# Patient Record
Sex: FEMALE | Race: WHITE | Employment: FULL TIME | URBAN - METROPOLITAN AREA
[De-identification: names, ages, dates, MRNs, and addresses within clinical notes are randomized per-mention and may not be internally consistent; named-entity substitution may affect disease eponyms.]

---

## 2017-11-16 ENCOUNTER — OFFICE VISIT (OUTPATIENT)
Dept: FAMILY MEDICINE CLINIC | Facility: CLINIC | Age: 47
End: 2017-11-16

## 2017-11-16 VITALS
DIASTOLIC BLOOD PRESSURE: 60 MMHG | WEIGHT: 139 LBS | SYSTOLIC BLOOD PRESSURE: 100 MMHG | OXYGEN SATURATION: 99 % | HEIGHT: 67.5 IN | HEART RATE: 64 BPM | TEMPERATURE: 98 F | RESPIRATION RATE: 17 BRPM | BODY MASS INDEX: 21.56 KG/M2

## 2017-11-16 DIAGNOSIS — R14.0 BLOATING: ICD-10-CM

## 2017-11-16 DIAGNOSIS — Z00.00 LABORATORY EXAM ORDERED AS PART OF ROUTINE GENERAL MEDICAL EXAMINATION: ICD-10-CM

## 2017-11-16 DIAGNOSIS — T78.1XXA FOOD SENSITIVITY WITH GASTROINTESTINAL SYMPTOMS: Primary | ICD-10-CM

## 2017-11-16 DIAGNOSIS — I48.0 PAROXYSMAL ATRIAL FIBRILLATION (HCC): ICD-10-CM

## 2017-11-16 DIAGNOSIS — Z12.31 ENCOUNTER FOR SCREENING MAMMOGRAM FOR BREAST CANCER: ICD-10-CM

## 2017-11-16 DIAGNOSIS — R53.82 CHRONIC FATIGUE: ICD-10-CM

## 2017-11-16 PROCEDURE — 86628 CANDIDA ANTIBODY: CPT | Performed by: FAMILY MEDICINE

## 2017-11-16 PROCEDURE — 86001 ALLERGEN SPECIFIC IGG: CPT | Performed by: FAMILY MEDICINE

## 2017-11-16 PROCEDURE — 84443 ASSAY THYROID STIM HORMONE: CPT | Performed by: FAMILY MEDICINE

## 2017-11-16 PROCEDURE — 85025 COMPLETE CBC W/AUTO DIFF WBC: CPT | Performed by: FAMILY MEDICINE

## 2017-11-16 PROCEDURE — 99204 OFFICE O/P NEW MOD 45 MIN: CPT | Performed by: FAMILY MEDICINE

## 2017-11-16 PROCEDURE — 82627 DEHYDROEPIANDROSTERONE: CPT | Performed by: FAMILY MEDICINE

## 2017-11-16 RX ORDER — MULTIVIT-MIN/IRON/FOLIC ACID/K 18-600-40
5000 CAPSULE ORAL
COMMUNITY
End: 2018-11-13

## 2017-11-16 NOTE — PROGRESS NOTES
Nabila Stack is a 51year old female. Patient presents with:  Establish Care: Interested in [place], due for mammogram      HPI:   Interested in an Integrative approach to her health. Has worked as an ER doctor for 18 years.  Recently cho Probiotic Product (PROBIOTIC-10) Oral Cap Take by mouth.  Disp:  Rfl:        SOCIAL HISTORY:     Social History  Social History   Marital status:   Spouse name: N/A    Years of education: N/A  Number of children: N/A     Occupational History  None on - ALLERGEN, FOOD COMMON PANEL IGG  - EDUARDA IGG/A/M AB PANEL    2. Laboratory exam ordered as part of routine general medical examination  - ALLERGEN, FOOD COMMON PANEL IGG; Future  - EDUARDA IGG/A/M AB PANEL;  Future  - DEHYDROEPIANDROSTERONE SULFATE  - T CBC W Differential W Platelet [E]    Patient Instructions   In order to determine if Medical Nutrition Therapy and/or Acupuncture are covered benefits, I encourage you call your insurance company and ask:  ·        If (acupuncture/medical nutrition) is An exercise program that I love is called Classical Stretch. It can work for a variety of fitness levels. It tones and stretches in alignment with the way your body functions. And it works the whole body.  You can find it on [facility] in the mornings and afterno · Focus on positive things in your life. You are a powerful creator in your life and when you focus on the positive you will be surprised at how that changes your world.      How to Get Started with a Meditation or Deep Breathing Practice    Start with 5 mi [email or web address]    [email or web address]    Marge.[email or web address]

## 2017-11-17 PROBLEM — I48.0 PAROXYSMAL ATRIAL FIBRILLATION (HCC): Status: ACTIVE | Noted: 2017-11-17

## 2017-12-11 ENCOUNTER — OFFICE VISIT (OUTPATIENT)
Dept: FAMILY MEDICINE CLINIC | Facility: CLINIC | Age: 47
End: 2017-12-11

## 2017-12-11 ENCOUNTER — MED REC SCAN ONLY (OUTPATIENT)
Dept: FAMILY MEDICINE CLINIC | Facility: CLINIC | Age: 47
End: 2017-12-11

## 2017-12-11 VITALS
SYSTOLIC BLOOD PRESSURE: 108 MMHG | TEMPERATURE: 98 F | BODY MASS INDEX: 22.18 KG/M2 | HEIGHT: 67.5 IN | OXYGEN SATURATION: 98 % | WEIGHT: 143 LBS | HEART RATE: 62 BPM | DIASTOLIC BLOOD PRESSURE: 62 MMHG

## 2017-12-11 DIAGNOSIS — T78.1XXA FOOD SENSITIVITY WITH GASTROINTESTINAL SYMPTOMS: ICD-10-CM

## 2017-12-11 DIAGNOSIS — R53.82 CHRONIC FATIGUE: ICD-10-CM

## 2017-12-11 DIAGNOSIS — E60 LOW ZINC LEVEL: Primary | ICD-10-CM

## 2017-12-11 PROCEDURE — 99214 OFFICE O/P EST MOD 30 MIN: CPT | Performed by: FAMILY MEDICINE

## 2017-12-11 NOTE — PATIENT INSTRUCTIONS
The products and items listed below (the “Products”)  and their claims have not been evaluated by the Food and Drug Administration. Dietary products are not intended to treat, prevent, mitigate or cure disease.  Ultimately, you must draw your own conclusion Practitioner's Last Name: Seema Do    Then register with your name, phone and address. Purchase the recommended products and they will be sent directly to your address. Activ[employer]rients    Description:  This high-quality, hypoallergenic, multivitamin/minera ? Grass-fed Beef  ? Spirulina  ? Chinese Cabbage  ? Cottage Cheese  ? Asparagus  ? Broccoli Kelly  ? [employer] (Cappuccini, [place] and Asprogia)  ? Venison  ? [place] ([facility])        RECIPE IDEAS  Broccoli Salad  Ingredients:   For dressing:  ? ¾ cup raw cashews, soaked Add all ingredients together in a large bowl, and toss to combine. Serve immediately.   (Or if you are not going to serve the entire salad in one setting, the salad can be mixed without the dressing and refrigerated in a sealed container for up to 2 days w ? 2 cloves garlic, chopped  ? 6 cups low-sodium chicken broth  ? 6 cups stemmed and torn mustard greens (about 1 bunch) or kale  ? 1 pound sweet potatoes (about 2 medium), peeled and cut into 1-inch pieces  ?  1 cup dried lentils  ? kosher salt and black pe

## 2017-12-19 NOTE — PROGRESS NOTES
Jose Lindsay is a 49year old female. Patient presents with:  Lab Results      HPI:   Feeling good. Took a month off from work, so getting to relax and take more time for herself.   Keeping busy, still exercising, but doing more low-impact, low-i • Implant left  2010   • Implant right  2010   • Needle biopsy left  2007   • Thoracotomy,ltd,biopsy  1984    left thoracotomy   • Upper gi endoscopy,biopsy  7/2013       PHYSICAL EXAM:      12/11/17  1319   BP: 108/62   Pulse: 62   Temp: 97.8 °F (36.6 °C) The products and items listed below (the “Products”)  and their claims have not been evaluated by the Food and Drug Administration. Dietary products are not intended to treat, prevent, mitigate or cure disease.  Ultimately, you must draw your own conclusion Practitioner's Last Name: Yuri Vital    Then register with your name, phone and address. Purchase the recommended products and they will be sent directly to your address. [employer]    Description:  This high-quality, hypoallergenic, multivitamin/minera ? Grass-fed Beef  ? Spirulina  ? Chinese Cabbage  ? Cottage Cheese  ? Asparagus  ? Broccoli Kelly  ? [employer] (Cappuccini, [place] and Asprogia)  ? Venison  ? [place] ([facility])        RECIPE IDEAS  Broccoli Salad  Ingredients:   For dressing:  ? ¾ cup raw cashews, soaked Add all ingredients together in a large bowl, and toss to combine. Serve immediately.   (Or if you are not going to serve the entire salad in one setting, the salad can be mixed without the dressing and refrigerated in a sealed container for up to 2 days w ? 2 cloves garlic, chopped  ? 6 cups low-sodium chicken broth  ? 6 cups stemmed and torn mustard greens (about 1 bunch) or kale  ? 1 pound sweet potatoes (about 2 medium), peeled and cut into 1-inch pieces  ?  1 cup dried lentils  ? kosher salt and black pe

## 2018-01-23 ENCOUNTER — OFFICE VISIT (OUTPATIENT)
Dept: OBGYN CLINIC | Facility: CLINIC | Age: 48
End: 2018-01-23

## 2018-01-23 VITALS
DIASTOLIC BLOOD PRESSURE: 62 MMHG | SYSTOLIC BLOOD PRESSURE: 108 MMHG | BODY MASS INDEX: 22 KG/M2 | HEART RATE: 69 BPM | WEIGHT: 141.63 LBS

## 2018-01-23 DIAGNOSIS — T83.32XA INTRAUTERINE CONTRACEPTIVE DEVICE THREADS LOST, INITIAL ENCOUNTER: ICD-10-CM

## 2018-01-23 DIAGNOSIS — Z01.419 ENCOUNTER FOR GYNECOLOGICAL EXAMINATION: Primary | ICD-10-CM

## 2018-01-23 PROCEDURE — 99386 PREV VISIT NEW AGE 40-64: CPT | Performed by: OBSTETRICS & GYNECOLOGY

## 2018-01-24 LAB — HPV I/H RISK 1 DNA SPEC QL NAA+PROBE: NEGATIVE

## 2018-01-24 NOTE — PROGRESS NOTES
Luis Manuel Arcos is a 49year old female [id number] No LMP recorded. Patient has had an ablation.  here for annual exam.       Last seen in past.   Had endometrial ablation in 2010 in [place] and had Mirena IUD placed immediately after for birth [facility] or double vision  Cardiovascular:  denies chest pain or palpitations  Respiratory:  denies shortness of breath  Gastrointestinal:  denies heartburn, abdominal pain, diarrhea or constipation  Genitourinary:  denies dysuria, incontinence, abnormal vaginal di Annual exams encouraged. RTC 1 year or prn    2. Intrauterine contraceptive device threads lost, initial encounter  Order for pelvic u/s. Pt will make another appt for IUD removal and replace Mirena IUD.       No prescriptions requested or ordered in thi

## 2018-01-30 ENCOUNTER — HOSPITAL ENCOUNTER (OUTPATIENT)
Dept: ULTRASOUND IMAGING | Age: 48
Discharge: HOME | End: 2018-01-30
Attending: OBSTETRICS & GYNECOLOGY
Payer: COMMERCIAL

## 2018-01-30 DIAGNOSIS — T83.32XA INTRAUTERINE CONTRACEPTIVE DEVICE THREADS LOST, INITIAL ENCOUNTER: ICD-10-CM

## 2018-01-30 PROCEDURE — 76830 TRANSVAGINAL US NON-OB: CPT | Performed by: OBSTETRICS & GYNECOLOGY

## 2018-01-30 PROCEDURE — 76856 US EXAM PELVIC COMPLETE: CPT | Performed by: OBSTETRICS & GYNECOLOGY

## 2018-02-08 ENCOUNTER — OFFICE VISIT (OUTPATIENT)
Dept: ORTHOPEDICS CLINIC | Facility: CLINIC | Age: 48
End: 2018-02-08

## 2018-02-08 VITALS — DIASTOLIC BLOOD PRESSURE: 58 MMHG | HEART RATE: 60 BPM | SYSTOLIC BLOOD PRESSURE: 100 MMHG | RESPIRATION RATE: 16 BRPM

## 2018-02-08 DIAGNOSIS — M25.511 PAIN IN JOINT OF RIGHT SHOULDER: Primary | ICD-10-CM

## 2018-02-08 PROCEDURE — 20610 DRAIN/INJ JOINT/BURSA W/O US: CPT | Performed by: ORTHOPAEDIC SURGERY

## 2018-02-08 PROCEDURE — 99243 OFF/OP CNSLTJ NEW/EST LOW 30: CPT | Performed by: ORTHOPAEDIC SURGERY

## 2018-02-08 PROCEDURE — 99212 OFFICE O/P EST SF 10 MIN: CPT | Performed by: ORTHOPAEDIC SURGERY

## 2018-02-08 NOTE — PROGRESS NOTES
Per verbal order from VT, draw up 2ml of Kenalog 10 and 2ml of 1% lidocaine for cortisone injection to right shoulder. Sandrita May    Patient left before post injection vitals were taken.  Sandrita May

## 2018-02-08 NOTE — H&P
Chief Complaint: Right shoulder pain    NURSING INTAKE COMMENTS: Patient presents with:  Shoulder Pain: Right - onset years ago - no injury - was dx with bursitis- had 3 times injections - pain restarted 2 years ago on and off and for the past 2 months [facility] WNL WNL   Atrophy None None   Bruising None None   Strength        Supraspinatus 5/5 5/5      Infraspinatus 5/5 5/5      Teres Minor 5/5 5/5      Subscapularis 5/5 5/5      Deltoid 5/5 5/5      Biceps 5/5 5/5        Pain         Rotator Cuff resistanc

## 2018-02-13 ENCOUNTER — TELEPHONE (OUTPATIENT)
Dept: OBGYN CLINIC | Facility: CLINIC | Age: 48
End: 2018-02-13

## 2018-02-14 ENCOUNTER — TELEPHONE (OUTPATIENT)
Dept: OBGYN CLINIC | Facility: CLINIC | Age: 48
End: 2018-02-14

## 2018-02-14 NOTE — TELEPHONE ENCOUNTER
LMTCB. Want to make sure pt aware of appt and recs for IUD removal, hcg the day prior and motrin prior and cytotec the night before.

## 2018-02-14 NOTE — TELEPHONE ENCOUNTER
Pt has an appt scheduled at this point on 2/28/18 for IUD removal only. Pt wants EMELYK's opinion.  Pt had sent mychart stating   There wasn't much of a stripe from what I could tell, so I am assuming with my age and ablation combined with the U/S finding I a

## 2018-02-15 NOTE — TELEPHONE ENCOUNTER
The endometrial lining is thin due to the endometrial ablation and Mirena IUD. She is at low risk for pregnancy due to age, not due to thin endometrial lining.   She can make final decision about reinsertion at time of her IUD removal

## 2018-02-28 ENCOUNTER — OFFICE VISIT (OUTPATIENT)
Dept: OBGYN CLINIC | Facility: CLINIC | Age: 48
End: 2018-02-28

## 2018-02-28 VITALS
BODY MASS INDEX: 22 KG/M2 | WEIGHT: 143.38 LBS | DIASTOLIC BLOOD PRESSURE: 64 MMHG | SYSTOLIC BLOOD PRESSURE: 103 MMHG | HEART RATE: 65 BPM

## 2018-02-28 DIAGNOSIS — Z30.432 ENCOUNTER FOR REMOVAL OF INTRAUTERINE CONTRACEPTIVE DEVICE: Primary | ICD-10-CM

## 2018-02-28 PROCEDURE — 58301 REMOVE INTRAUTERINE DEVICE: CPT | Performed by: OBSTETRICS & GYNECOLOGY

## 2018-02-28 NOTE — PROCEDURES
IUD Removal     Consent signed. Procedure discussed with the patient in detail including indication, risks, benefits, alternatives and complications.     Pelvic Exam Findings:  Lesion description:  IUD strings NOT seen from cervix    Procedure:  Speculum

## 2018-04-03 ENCOUNTER — PATIENT MESSAGE (OUTPATIENT)
Dept: ORTHOPEDICS CLINIC | Facility: CLINIC | Age: 48
End: 2018-04-03

## 2018-04-03 DIAGNOSIS — M75.121 COMPLETE TEAR OF RIGHT ROTATOR CUFF: Primary | ICD-10-CM

## 2018-04-03 NOTE — TELEPHONE ENCOUNTER
From: Mega Cadet  To: Aba Mar MD  Sent: 4/3/2018 10:01 AM CDT  Subject: Visit Follow-up Question    Antonino Oetro.     I pretty much haven’t been doing anything, and my shoulder still bothers me quite a bit if I reach behind me, like to scr

## 2018-04-04 ENCOUNTER — TELEPHONE (OUTPATIENT)
Dept: ORTHOPEDICS CLINIC | Facility: CLINIC | Age: 48
End: 2018-04-04

## 2018-04-04 NOTE — TELEPHONE ENCOUNTER
Called VANESSA and s/w Valdemar to initiate PA for MRI right shoulder and gave him clinicals per VT OV notes. MRI approved [facility]#[id number] exp 5/3/18 at [address] [place] [address].    Called pt DIGNA

## 2018-04-10 ENCOUNTER — TELEPHONE (OUTPATIENT)
Dept: ORTHOPEDICS CLINIC | Facility: CLINIC | Age: 48
End: 2018-04-10

## 2018-04-10 ENCOUNTER — HOSPITAL ENCOUNTER (OUTPATIENT)
Dept: MRI IMAGING | Facility: HOSPITAL | Age: 48
Discharge: HOME | End: 2018-04-10
Attending: ORTHOPAEDIC SURGERY
Payer: COMMERCIAL

## 2018-04-10 DIAGNOSIS — M75.121 COMPLETE TEAR OF RIGHT ROTATOR CUFF: ICD-10-CM

## 2018-04-10 PROCEDURE — 73221 MRI JOINT UPR EXTREM W/O DYE: CPT | Performed by: ORTHOPAEDIC SURGERY

## 2018-04-16 ENCOUNTER — TELEPHONE (OUTPATIENT)
Dept: ORTHOPEDICS CLINIC | Facility: CLINIC | Age: 48
End: 2018-04-16

## 2018-04-16 DIAGNOSIS — M75.101 ROTATOR CUFF SYNDROME OF RIGHT SHOULDER: Primary | ICD-10-CM

## 2018-04-16 NOTE — TELEPHONE ENCOUNTER
Patient is looking to see if an order can be put in for physical therapy for right shoulder pain. Please call. Thank you.

## 2018-04-19 NOTE — TELEPHONE ENCOUNTER
Call to [facility]. Notified of 8 physical therapy visits. Pt wants to have therapy done at [facility]. Informed pt to get a fax number and call us back with it and we will fax therapy order to facility.    Verbaloizes understanding and will call back with a fax

## 2018-05-04 ENCOUNTER — HOSPITAL ENCOUNTER (OUTPATIENT)
Dept: GENERAL RADIOLOGY | Facility: HOSPITAL | Age: 48
Discharge: HOME | End: 2018-05-04
Attending: ORTHOPAEDIC SURGERY
Payer: COMMERCIAL

## 2018-05-04 DIAGNOSIS — G89.29 CHRONIC RIGHT SHOULDER PAIN: ICD-10-CM

## 2018-05-04 DIAGNOSIS — M25.511 CHRONIC RIGHT SHOULDER PAIN: ICD-10-CM

## 2018-05-04 PROCEDURE — 20610 DRAIN/INJ JOINT/BURSA W/O US: CPT | Performed by: ORTHOPAEDIC SURGERY

## 2018-05-04 PROCEDURE — 77002 NEEDLE LOCALIZATION BY XRAY: CPT | Performed by: ORTHOPAEDIC SURGERY

## 2018-05-04 RX ORDER — LIDOCAINE HYDROCHLORIDE 10 MG/ML
5 INJECTION, SOLUTION EPIDURAL; INFILTRATION; INTRACAUDAL; PERINEURAL ONCE
Status: COMPLETED | OUTPATIENT
Start: 2018-05-04 | End: 2018-05-04

## 2018-05-04 RX ORDER — LIDOCAINE HYDROCHLORIDE 10 MG/ML
INJECTION, SOLUTION EPIDURAL; INFILTRATION; INTRACAUDAL; PERINEURAL
Status: COMPLETED
Start: 2018-05-04 | End: 2018-05-04

## 2018-05-04 RX ORDER — METHYLPREDNISOLONE ACETATE 40 MG/ML
40 INJECTION, SUSPENSION INTRA-ARTICULAR; INTRALESIONAL; INTRAMUSCULAR; SOFT TISSUE ONCE
Status: COMPLETED | OUTPATIENT
Start: 2018-05-04 | End: 2018-05-04

## 2018-05-04 RX ORDER — METHYLPREDNISOLONE ACETATE 40 MG/ML
INJECTION, SUSPENSION INTRA-ARTICULAR; INTRALESIONAL; INTRAMUSCULAR; SOFT TISSUE
Status: COMPLETED
Start: 2018-05-04 | End: 2018-05-04

## 2018-05-04 RX ADMIN — METHYLPREDNISOLONE ACETATE 40 MG: 40 INJECTION, SUSPENSION INTRA-ARTICULAR; INTRALESIONAL; INTRAMUSCULAR; SOFT TISSUE at 10:48:00

## 2018-05-04 RX ADMIN — LIDOCAINE HYDROCHLORIDE 5 ML: 10 INJECTION, SOLUTION EPIDURAL; INFILTRATION; INTRACAUDAL; PERINEURAL at 10:15:00

## 2018-05-10 ENCOUNTER — OFFICE VISIT (OUTPATIENT)
Dept: PHYSICAL THERAPY | Age: 48
End: 2018-05-10
Attending: ORTHOPAEDIC SURGERY
Payer: COMMERCIAL

## 2018-05-10 DIAGNOSIS — M75.101 ROTATOR CUFF SYNDROME OF RIGHT SHOULDER: ICD-10-CM

## 2018-05-10 PROCEDURE — 97162 PT EVAL MOD COMPLEX 30 MIN: CPT | Performed by: PHYSICAL THERAPIST

## 2018-05-10 NOTE — PROGRESS NOTES
UPPER EXTREMITY EVALUATION:   Referring Physician: Dr. Carolyn Smith  Diagnosis: Rotator cuff syndrome of right shoulder (M75.101)  Date of Onset: 4/19/18 Date of Service: 5/10/2018     PATIENT SUMMARY   Emily Ayala is a 49year old y/o female who case, belt, or table) 10 reps every 2-3 hours; test motion (forward and abduction motion noting symptoms) as comparison before and after.     Charges: PT Eval x1      Total Timed Treatment: 0 mins     Total Treatment Time: 42 mins         PLAN OF CARE:    G

## 2018-05-15 ENCOUNTER — OFFICE VISIT (OUTPATIENT)
Dept: PHYSICAL THERAPY | Age: 48
End: 2018-05-15
Attending: ORTHOPAEDIC SURGERY
Payer: COMMERCIAL

## 2018-05-15 DIAGNOSIS — M75.101 ROTATOR CUFF SYNDROME OF RIGHT SHOULDER: ICD-10-CM

## 2018-05-15 PROCEDURE — 97110 THERAPEUTIC EXERCISES: CPT | Performed by: PHYSICAL THERAPIST

## 2018-05-15 NOTE — PROGRESS NOTES
Date: 5/15/2018     Dx: Rotator cuff syndrome of right shoulder (M75.101)        Authorized # of Visits:  8       Referring MD: Kathleen Phan  Next MD visit: none scheduled    Medication Changes since last visit?: No    Subjective: Patient reports that the (R (R) shoiulder AROM in all directions with at least 4/5 MMT in all motions to promote a normal return to all workout (yoga, weight lifting, exercise classes) without discomfort and deviation.                 Plan:   Re-assess next visit and continue with dir

## 2018-05-17 ENCOUNTER — APPOINTMENT (OUTPATIENT)
Dept: PHYSICAL THERAPY | Age: 48
End: 2018-05-17
Attending: ORTHOPAEDIC SURGERY
Payer: COMMERCIAL

## 2018-05-24 ENCOUNTER — APPOINTMENT (OUTPATIENT)
Dept: PHYSICAL THERAPY | Age: 48
End: 2018-05-24
Attending: ORTHOPAEDIC SURGERY
Payer: COMMERCIAL

## 2018-05-29 ENCOUNTER — OFFICE VISIT (OUTPATIENT)
Dept: PHYSICAL THERAPY | Age: 48
End: 2018-05-29
Attending: ORTHOPAEDIC SURGERY
Payer: COMMERCIAL

## 2018-05-29 PROCEDURE — 97110 THERAPEUTIC EXERCISES: CPT | Performed by: PHYSICAL THERAPIST

## 2018-05-29 NOTE — PROGRESS NOTES
Date: 5/15/2018     Dx: Rotator cuff syndrome of right shoulder (M75.101)        Authorized # of Visits:  8       Referring MD: No ref.  provider found  Next MD visit: none scheduled    Medication Changes since last visit?: No    Subjective: Patient reports release and catch 1 lb ball 5 sets x 20 reps; NE    Rhytmic circles with 1 kg ball on wall OH 5 sets x 10 reps ea; NE    Repeated (R) UE IR with mat OP x 10 reps; NE                    Assessment:   Progressed her repeated (R) shoulder IR motion with mat O

## 2018-06-06 ENCOUNTER — OFFICE VISIT (OUTPATIENT)
Dept: PHYSICAL THERAPY | Age: 48
End: 2018-06-06
Attending: INTERNAL MEDICINE
Payer: COMMERCIAL

## 2018-06-06 PROCEDURE — 97110 THERAPEUTIC EXERCISES: CPT | Performed by: PHYSICAL THERAPIST

## 2018-06-06 NOTE — PROGRESS NOTES
Date: 6/6/2018     Dx: Rotator cuff syndrome of right shoulder (M75.101)        Authorized # of Visits:  8       Referring MD: Annia Hancock MD visit: none scheduled    Medication Changes since last visit?: No    Subjective: Patient reports that the (R) sets x 20 reps; NE    Rhytmic circles with 1 kg ball on wall OH 5 sets x 10 reps ea; NE    Repeated (R) UE IR with mat OP x 10 reps; NE       Scifit: UE only L6 3-4 mins fwd/bkwd; NE    Repeated (R) shoulder IR with rail OP x 10 reps; NE    Repeated (R) sh FOTO: Discharge: 82/100; Initial: 66/100;  Goal:  74/100      Plan:   Discharge from physical therapy with HEP. Charges:  TherEx x 3       Total Timed Treatment: 42 mins Total Treatment Time: 45 mins

## 2018-08-22 ENCOUNTER — HOSPITAL ENCOUNTER (OUTPATIENT)
Dept: GENERAL RADIOLOGY | Age: 48
Discharge: HOME | End: 2018-08-22
Attending: INTERNAL MEDICINE
Payer: COMMERCIAL

## 2018-08-22 DIAGNOSIS — M25.561 RIGHT KNEE PAIN: ICD-10-CM

## 2018-08-22 PROCEDURE — 73560 X-RAY EXAM OF KNEE 1 OR 2: CPT | Performed by: INTERNAL MEDICINE

## 2018-09-04 ENCOUNTER — TELEPHONE (OUTPATIENT)
Dept: ORTHOPEDICS CLINIC | Facility: CLINIC | Age: 48
End: 2018-09-04

## 2018-09-06 ENCOUNTER — OFFICE VISIT (OUTPATIENT)
Dept: ORTHOPEDICS CLINIC | Facility: CLINIC | Age: 48
End: 2018-09-06
Payer: COMMERCIAL

## 2018-09-06 DIAGNOSIS — S83.8X1A ACUTE MEDIAL MENISCAL INJURY OF RIGHT KNEE, INITIAL ENCOUNTER: Primary | ICD-10-CM

## 2018-09-06 PROCEDURE — 99213 OFFICE O/P EST LOW 20 MIN: CPT | Performed by: ORTHOPAEDIC SURGERY

## 2018-09-06 PROCEDURE — 20610 DRAIN/INJ JOINT/BURSA W/O US: CPT | Performed by: ORTHOPAEDIC SURGERY

## 2018-09-06 PROCEDURE — 99212 OFFICE O/P EST SF 10 MIN: CPT | Performed by: ORTHOPAEDIC SURGERY

## 2018-09-06 NOTE — PROGRESS NOTES
Verbal order given by Dr. Delfin Yusuf to draw up 30 mg of Kenalog and 3 cc 1% lidocaine to inject pt in the right knee.   Pt had left office before getting post injection vitals

## 2018-09-06 NOTE — H&P
Chief Complaint: Establish patient with a new problem of right knee pain    NURSING INTAKE COMMENTS: Patient presents with:  Knee Pain: C/o right knee pain when she's sitting down and the knee is bent for 2 months. Recalls no injury.   Knee xray taken comp Atrophy None None        Strength        Quadriceps 5/5 5/5      Hamstrings 5/5 5/5        Joint line tenderness        Medial None +++      Lateral None None      Pes anserinus None None      Patellar tendon None None        Steven Negative +++ that would be necessary afterwards. All the patient's questions were answered. The patient expressed understanding of my explanations and plan of care. Informed consent was obtained.

## 2018-09-10 ENCOUNTER — TELEPHONE (OUTPATIENT)
Dept: ORTHOPEDICS CLINIC | Facility: CLINIC | Age: 48
End: 2018-09-10

## 2018-09-11 NOTE — TELEPHONE ENCOUNTER
Called and spoke to [place] ARMENDARIZ @[facility] who states no pre Juve Wilson is required for MRI  Ref #[id number]

## 2018-09-12 ENCOUNTER — TELEPHONE (OUTPATIENT)
Dept: ORTHOPEDICS CLINIC | Facility: CLINIC | Age: 48
End: 2018-09-12

## 2018-09-12 NOTE — TELEPHONE ENCOUNTER
MRI authorization   Jourdan from [facility] today   Spoke to representative [place] ARMENDARIZ No prior authorization required for MRI  Call referenced number [id number]    Call to [facility]. Made aware that she does not need MRI prior authorization. l

## 2018-11-06 ENCOUNTER — HOSPITAL ENCOUNTER (OUTPATIENT)
Dept: GENERAL RADIOLOGY | Age: 48
Discharge: HOME | End: 2018-11-06
Attending: EMERGENCY MEDICINE
Payer: COMMERCIAL

## 2018-11-06 DIAGNOSIS — R52 PAIN: ICD-10-CM

## 2018-11-06 PROCEDURE — 73080 X-RAY EXAM OF ELBOW: CPT | Performed by: EMERGENCY MEDICINE

## 2018-11-08 ENCOUNTER — TELEPHONE (OUTPATIENT)
Dept: ORTHOPEDICS CLINIC | Facility: CLINIC | Age: 48
End: 2018-11-08

## 2018-11-08 NOTE — TELEPHONE ENCOUNTER
Called [phone] at [facility] spoke [place] and was informed the surgery on 11/13/18 with VBT for right knee scope-- no PA needed  reference number was [id number]

## 2018-11-15 ENCOUNTER — ANESTHESIA (OUTPATIENT)
Dept: SURGERY | Facility: HOSPITAL | Age: 48
End: 2018-11-15
Payer: COMMERCIAL

## 2018-11-15 ENCOUNTER — ANESTHESIA EVENT (OUTPATIENT)
Dept: SURGERY | Facility: HOSPITAL | Age: 48
End: 2018-11-15
Payer: COMMERCIAL

## 2018-11-15 ENCOUNTER — HOSPITAL ENCOUNTER (OUTPATIENT)
Facility: HOSPITAL | Age: 48
Setting detail: HOSPITAL OUTPATIENT SURGERY
Discharge: HOME | End: 2018-11-15
Attending: ORTHOPAEDIC SURGERY | Admitting: ORTHOPAEDIC SURGERY
Payer: COMMERCIAL

## 2018-11-15 VITALS
HEART RATE: 74 BPM | RESPIRATION RATE: 15 BRPM | TEMPERATURE: 98 F | WEIGHT: 140 LBS | SYSTOLIC BLOOD PRESSURE: 110 MMHG | OXYGEN SATURATION: 98 % | HEIGHT: 68 IN | DIASTOLIC BLOOD PRESSURE: 60 MMHG | BODY MASS INDEX: 21.22 KG/M2

## 2018-11-15 DIAGNOSIS — S83.281A ACUTE LATERAL MENISCUS TEAR OF RIGHT KNEE, INITIAL ENCOUNTER: ICD-10-CM

## 2018-11-15 PROCEDURE — 0SBC4ZZ EXCISION OF RIGHT KNEE JOINT, PERCUTANEOUS ENDOSCOPIC APPROACH: ICD-10-PCS | Performed by: ORTHOPAEDIC SURGERY

## 2018-11-15 PROCEDURE — 81025 URINE PREGNANCY TEST: CPT

## 2018-11-15 RX ORDER — HALOPERIDOL 5 MG/ML
0.25 INJECTION INTRAMUSCULAR ONCE AS NEEDED
Status: DISCONTINUED | OUTPATIENT
Start: 2018-11-15 | End: 2018-11-15

## 2018-11-15 RX ORDER — METOCLOPRAMIDE 10 MG/1
10 TABLET ORAL ONCE
Status: DISCONTINUED | OUTPATIENT
Start: 2018-11-15 | End: 2018-11-15 | Stop reason: HOSPADM

## 2018-11-15 RX ORDER — MAGNESIUM HYDROXIDE 1200 MG/15ML
LIQUID ORAL CONTINUOUS PRN
Status: COMPLETED | OUTPATIENT
Start: 2018-11-15 | End: 2018-11-15

## 2018-11-15 RX ORDER — ACETAMINOPHEN 500 MG
1000 TABLET ORAL ONCE
Status: COMPLETED | OUTPATIENT
Start: 2018-11-15 | End: 2018-11-15

## 2018-11-15 RX ORDER — NALOXONE HYDROCHLORIDE 0.4 MG/ML
80 INJECTION, SOLUTION INTRAMUSCULAR; INTRAVENOUS; SUBCUTANEOUS AS NEEDED
Status: DISCONTINUED | OUTPATIENT
Start: 2018-11-15 | End: 2018-11-15

## 2018-11-15 RX ORDER — SODIUM CHLORIDE, SODIUM LACTATE, POTASSIUM CHLORIDE, CALCIUM CHLORIDE 600; 310; 30; 20 MG/100ML; MG/100ML; MG/100ML; MG/100ML
INJECTION, SOLUTION INTRAVENOUS CONTINUOUS
Status: DISCONTINUED | OUTPATIENT
Start: 2018-11-15 | End: 2018-11-15

## 2018-11-15 RX ORDER — OXYCODONE HYDROCHLORIDE AND ACETAMINOPHEN 5; 325 MG/1; MG/1
1 TABLET ORAL EVERY 4 HOURS PRN
Qty: 15 TABLET | Refills: 0 | Status: SHIPPED | OUTPATIENT
Start: 2018-11-15 | End: 2018-11-15

## 2018-11-15 RX ORDER — MIDAZOLAM HYDROCHLORIDE 1 MG/ML
INJECTION INTRAMUSCULAR; INTRAVENOUS AS NEEDED
Status: DISCONTINUED | OUTPATIENT
Start: 2018-11-15 | End: 2018-11-15 | Stop reason: SURG

## 2018-11-15 RX ORDER — BUPIVACAINE HYDROCHLORIDE AND EPINEPHRINE 2.5; 5 MG/ML; UG/ML
INJECTION, SOLUTION INFILTRATION; PERINEURAL AS NEEDED
Status: DISCONTINUED | OUTPATIENT
Start: 2018-11-15 | End: 2018-11-15 | Stop reason: HOSPADM

## 2018-11-15 RX ORDER — ONDANSETRON 2 MG/ML
INJECTION INTRAMUSCULAR; INTRAVENOUS AS NEEDED
Status: DISCONTINUED | OUTPATIENT
Start: 2018-11-15 | End: 2018-11-15 | Stop reason: SURG

## 2018-11-15 RX ORDER — LIDOCAINE HYDROCHLORIDE 10 MG/ML
INJECTION, SOLUTION EPIDURAL; INFILTRATION; INTRACAUDAL; PERINEURAL AS NEEDED
Status: DISCONTINUED | OUTPATIENT
Start: 2018-11-15 | End: 2018-11-15 | Stop reason: SURG

## 2018-11-15 RX ORDER — OXYCODONE HYDROCHLORIDE AND ACETAMINOPHEN 5; 325 MG/1; MG/1
1 TABLET ORAL EVERY 6 HOURS PRN
Qty: 15 TABLET | Refills: 0 | Status: SHIPPED | OUTPATIENT
Start: 2018-11-15 | End: 2018-11-15

## 2018-11-15 RX ORDER — OXYCODONE HYDROCHLORIDE AND ACETAMINOPHEN 5; 325 MG/1; MG/1
1 TABLET ORAL EVERY 6 HOURS PRN
Qty: 15 TABLET | Refills: 0 | Status: SHIPPED | OUTPATIENT
Start: 2018-11-15

## 2018-11-15 RX ORDER — MORPHINE SULFATE 10 MG/ML
6 INJECTION, SOLUTION INTRAMUSCULAR; INTRAVENOUS EVERY 10 MIN PRN
Status: DISCONTINUED | OUTPATIENT
Start: 2018-11-15 | End: 2018-11-15

## 2018-11-15 RX ORDER — CEFAZOLIN SODIUM/WATER 2 G/20 ML
2 SYRINGE (ML) INTRAVENOUS ONCE
Status: COMPLETED | OUTPATIENT
Start: 2018-11-15 | End: 2018-11-15

## 2018-11-15 RX ORDER — ONDANSETRON 2 MG/ML
4 INJECTION INTRAMUSCULAR; INTRAVENOUS ONCE AS NEEDED
Status: DISCONTINUED | OUTPATIENT
Start: 2018-11-15 | End: 2018-11-15

## 2018-11-15 RX ORDER — EPHEDRINE SULFATE 50 MG/ML
INJECTION, SOLUTION INTRAVENOUS AS NEEDED
Status: DISCONTINUED | OUTPATIENT
Start: 2018-11-15 | End: 2018-11-15 | Stop reason: SURG

## 2018-11-15 RX ORDER — MORPHINE SULFATE 4 MG/ML
4 INJECTION, SOLUTION INTRAMUSCULAR; INTRAVENOUS EVERY 10 MIN PRN
Status: DISCONTINUED | OUTPATIENT
Start: 2018-11-15 | End: 2018-11-15

## 2018-11-15 RX ORDER — DEXAMETHASONE SODIUM PHOSPHATE 4 MG/ML
VIAL (ML) INJECTION AS NEEDED
Status: DISCONTINUED | OUTPATIENT
Start: 2018-11-15 | End: 2018-11-15 | Stop reason: SURG

## 2018-11-15 RX ORDER — FAMOTIDINE 20 MG/1
20 TABLET ORAL ONCE
Status: DISCONTINUED | OUTPATIENT
Start: 2018-11-15 | End: 2018-11-15 | Stop reason: HOSPADM

## 2018-11-15 RX ORDER — ASPIRIN 325 MG
325 TABLET ORAL DAILY
Qty: 14 TABLET | Refills: 0 | Status: SHIPPED | OUTPATIENT
Start: 2018-11-15

## 2018-11-15 RX ORDER — ONDANSETRON 2 MG/ML
4 INJECTION INTRAMUSCULAR; INTRAVENOUS EVERY 6 HOURS PRN
Status: CANCELLED | OUTPATIENT
Start: 2018-11-15

## 2018-11-15 RX ORDER — ONDANSETRON 4 MG/1
4 TABLET, FILM COATED ORAL EVERY 8 HOURS PRN
Qty: 15 TABLET | Refills: 0 | Status: SHIPPED | OUTPATIENT
Start: 2018-11-15 | End: 2018-11-30

## 2018-11-15 RX ORDER — HYDROCODONE BITARTRATE AND ACETAMINOPHEN 5; 325 MG/1; MG/1
1 TABLET ORAL EVERY 6 HOURS PRN
Qty: 20 TABLET | Refills: 0 | Status: SHIPPED | OUTPATIENT
Start: 2018-11-15 | End: 2018-11-15

## 2018-11-15 RX ORDER — MORPHINE SULFATE 4 MG/ML
2 INJECTION, SOLUTION INTRAMUSCULAR; INTRAVENOUS EVERY 10 MIN PRN
Status: DISCONTINUED | OUTPATIENT
Start: 2018-11-15 | End: 2018-11-15

## 2018-11-15 RX ADMIN — EPHEDRINE SULFATE 5 MG: 50 INJECTION, SOLUTION INTRAVENOUS at 10:51:00

## 2018-11-15 RX ADMIN — SODIUM CHLORIDE, SODIUM LACTATE, POTASSIUM CHLORIDE, CALCIUM CHLORIDE: 600; 310; 30; 20 INJECTION, SOLUTION INTRAVENOUS at 10:29:00

## 2018-11-15 RX ADMIN — DEXAMETHASONE SODIUM PHOSPHATE 4 MG: 4 MG/ML VIAL (ML) INJECTION at 10:43:00

## 2018-11-15 RX ADMIN — LIDOCAINE HYDROCHLORIDE 25 MG: 10 INJECTION, SOLUTION EPIDURAL; INFILTRATION; INTRACAUDAL; PERINEURAL at 10:34:00

## 2018-11-15 RX ADMIN — MIDAZOLAM HYDROCHLORIDE 2 MG: 1 INJECTION INTRAMUSCULAR; INTRAVENOUS at 10:30:00

## 2018-11-15 RX ADMIN — ONDANSETRON 4 MG: 2 INJECTION INTRAMUSCULAR; INTRAVENOUS at 10:43:00

## 2018-11-15 RX ADMIN — SODIUM CHLORIDE, SODIUM LACTATE, POTASSIUM CHLORIDE, CALCIUM CHLORIDE: 600; 310; 30; 20 INJECTION, SOLUTION INTRAVENOUS at 11:14:00

## 2018-11-15 RX ADMIN — CEFAZOLIN SODIUM/WATER 2 G: 2 G/20 ML SYRINGE (ML) INTRAVENOUS at 10:39:00

## 2018-11-15 NOTE — BRIEF OP NOTE
Pre-Operative Diagnosis: Acute lateral meniscus tear of right knee, initial encounter [S83.281A]     Post-Operative Diagnosis: Acute lateral meniscus tear of right knee, initial encounter [H76.747D]      Procedure Performed:   Procedure(s):  RIGHT KNEE ART

## 2018-11-15 NOTE — OPERATIVE REPORT
[facility]    PATIENT'S NAME: Filippo Anderson   ATTENDING PHYSICIAN: Nato Dixon MD   OPERATING PHYSICIAN: Nato Dixon MD   PATIENT ACCOUNT#:   [id number]    LOCATION:  [place] [address]  MEDICAL RECORD #:   [id number]       D the right knee revealed full range of motion with no evidence of instability. The right lower extremity was prepped and draped in the usual sterile fashion. Prior to beginning, a time-out was performed and preoperative antibiotics were infused.   Nadiya

## 2018-11-15 NOTE — ANESTHESIA POSTPROCEDURE EVALUATION
Patient: Rene Langley    Procedure Summary     Date:  11/15/18 Room / Location:  [address] [place] [address] MAIN OR 09 / [address] [facility][note 2] MAIN OR    Anesthesia Start:  1853 Anesthesia Stop:  1154    Procedure:  KNEE ARTHROSCOPY (Right Knee) Diagnosis:       Acute lateral meniscus tear

## 2018-11-15 NOTE — ANESTHESIA PROCEDURE NOTES
ANESTHESIA INTUBATION  Date/Time: 11/15/2018 10:34 AM  Urgency: elective    Airway not difficult    General Information and Staff    Patient location during procedure: OR  Anesthesiologist: Arsenio Lora DO  Resident/CRNA: Josie Crews CRNA  Perfor

## 2018-11-15 NOTE — H&P
History & Physical Examination    Patient Name: Ryan Guido  MRN: [id number]  [facility]: [id number]  YOB: 1970    Diagnosis: Right knee internal derangement    Present Illness: Right knee pain not responsive to conservative measures      N ENDOSCOPY,BIOPSY  7/2013     Family History   Problem Relation Age of Onset   • Heart Disorder Father 62   • Alcohol and Other Disorders Associated Father      Social History    Tobacco Use      Smoking status: Former Smoker        Types: Cigarettes

## 2018-11-15 NOTE — ANESTHESIA PREPROCEDURE EVALUATION
Anesthesia PreOp Note    HPI:     Jose Carroll is a 49year old female who presents for preoperative consultation requested by:  Dereck Pinto MD    Date of Surgery: 11/15/2018    Procedure(s):  KNEE ARTHROSCOPY  Indication: Acute lateral meni Spouse name: Not on file      Number of children: Not on file      Years of education: Not on file      Highest education level: Not on file    Social Needs      Financial resource strain: Not on file      Food insecurity - worry: Not on file      Food ins full  Dental      Pulmonary - negative ROS and normal exam   Cardiovascular - negative ROS and normal exam    Neuro/Psych - negative ROS     GI/Hepatic/Renal - negative ROS     Endo/Other - negative ROS   Abdominal              Anesthesia Plan:   ASA:  1

## 2018-12-03 ENCOUNTER — OFFICE VISIT (OUTPATIENT)
Dept: ORTHOPEDICS CLINIC | Facility: CLINIC | Age: 48
End: 2018-12-03
Payer: COMMERCIAL

## 2018-12-03 DIAGNOSIS — S83.281A ACUTE LATERAL MENISCUS TEAR OF RIGHT KNEE, INITIAL ENCOUNTER: Primary | ICD-10-CM

## 2018-12-03 DIAGNOSIS — S83.8X1A ACUTE MEDIAL MENISCAL INJURY OF RIGHT KNEE, INITIAL ENCOUNTER: ICD-10-CM

## 2018-12-03 PROCEDURE — 99024 POSTOP FOLLOW-UP VISIT: CPT | Performed by: ORTHOPAEDIC SURGERY

## 2018-12-03 PROCEDURE — 99212 OFFICE O/P EST SF 10 MIN: CPT | Performed by: ORTHOPAEDIC SURGERY

## 2018-12-03 NOTE — PROGRESS NOTES
NURSING INTAKE COMMENTS: Patient presents with:  Knee Pain: Pt is here for a post operative visit right knee arthroscopy. Date of surgery 11-15-18. Pain level 0  Post-Op      Patient presents 2 weeks status post right knee scope.   The patient reports mild p Types: 3 Standard drinks or equivalent per week        Frequency: Never      Drug use: No    Other Topics      Concerns:    Social History Narrative      ER physician      3 children      Ab Babin or Wilton Mann       is Intervential radiolo

## 2019-08-01 NOTE — TELEPHONE ENCOUNTER
Patient calling to see if she can be seen this week for knee pain. States tried making an appt online and noticed soonest appt available wasn't until 09/19. States her knee is really bothering her and can not wait that long. Please call. Thank you. not applicable

## 2021-11-01 ENCOUNTER — TELEPHONE (OUTPATIENT)
Dept: OBGYN CLINIC | Facility: CLINIC | Age: 51
End: 2021-11-01

## 2021-11-01 NOTE — TELEPHONE ENCOUNTER
Pt had an appt with EMELYK on 11/4 at 820am and had to cancel. 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 She states she can come same day 9:45 AM  -10am or later. Sent to [facility] [address] if you can add pt on later on 11/4/21.

## 2021-11-01 NOTE — TELEPHONE ENCOUNTER
Pt on staff at [facility] and just got called to work on 11/4 early. Pt is asking if she can come same day 9:45-10am or later. Please advise.

## 2021-11-04 ENCOUNTER — OFFICE VISIT (OUTPATIENT)
Dept: OBGYN CLINIC | Facility: CLINIC | Age: 51
End: 2021-11-04
Payer: COMMERCIAL

## 2021-11-04 VITALS
DIASTOLIC BLOOD PRESSURE: 69 MMHG | HEART RATE: 64 BPM | BODY MASS INDEX: 21 KG/M2 | WEIGHT: 137.19 LBS | SYSTOLIC BLOOD PRESSURE: 106 MMHG

## 2021-11-04 DIAGNOSIS — Z12.4 SCREENING FOR MALIGNANT NEOPLASM OF CERVIX: ICD-10-CM

## 2021-11-04 DIAGNOSIS — Z12.31 ENCOUNTER FOR SCREENING MAMMOGRAM FOR BREAST CANCER: ICD-10-CM

## 2021-11-04 DIAGNOSIS — Z01.419 ENCOUNTER FOR GYNECOLOGICAL EXAMINATION: Primary | ICD-10-CM

## 2021-11-04 PROCEDURE — 3078F DIAST BP <80 MM HG: CPT | Performed by: OBSTETRICS & GYNECOLOGY

## 2021-11-04 PROCEDURE — 3074F SYST BP LT 130 MM HG: CPT | Performed by: OBSTETRICS & GYNECOLOGY

## 2021-11-04 PROCEDURE — 99396 PREV VISIT EST AGE 40-64: CPT | Performed by: OBSTETRICS & GYNECOLOGY

## 2021-11-04 RX ORDER — ESTRADIOL 0.1 MG/G
0.5 CREAM VAGINAL
Qty: 42.5 G | Refills: 3 | Status: SHIPPED | OUTPATIENT
Start: 2021-11-04

## 2022-01-03 NOTE — PATIENT INSTRUCTIONS
In order to determine if Medical Nutrition Therapy and/or Acupuncture are covered benefits, I encourage you call your insurance company and ask:  ·        If (acupuncture/medical nutrition) is covered benefit?   ·        How many visits are allowed for the An exercise program that I love is called Classical Stretch. It can work for a variety of fitness levels. It tones and stretches in alignment with the way your body functions. And it works the whole body.  You can find it on [facility] in the mornings and afterno · Focus on positive things in your life. You are a powerful creator in your life and when you focus on the positive you will be surprised at how that changes your world.      How to Get Started with a Meditation or Deep Breathing Practice    Start with 5 mi [email or web address]    [email or web address]    Marge.[email or web address] stated

## 2022-02-07 ENCOUNTER — LAB REQUISITION (OUTPATIENT)
Dept: LAB | Age: 52
End: 2022-02-07

## 2022-02-07 DIAGNOSIS — Z11.59 ENCOUNTER FOR SCREENING FOR OTHER VIRAL DISEASES: ICD-10-CM

## 2022-03-10 ENCOUNTER — LAB REQUISITION (OUTPATIENT)
Dept: LAB | Age: 52
End: 2022-03-10

## 2022-03-10 DIAGNOSIS — Z11.59 ENCOUNTER FOR SCREENING FOR OTHER VIRAL DISEASES: ICD-10-CM

## (undated) DEVICE — CHLORAPREP 26ML APPLICATOR

## (undated) DEVICE — TUBING IRR 16FT CNT WV 3 ASCP

## (undated) DEVICE — GAMMEX® PI HYBRID SIZE 6.5, STERILE POWDER-FREE SURGICAL GLOVE, POLYISOPRENE AND NEOPRENE BLEND: Brand: GAMMEX

## (undated) DEVICE — NEEDLE HPO 18GA 1.5IN ECLPS

## (undated) DEVICE — BLADE SHVR COOLCUT 13CM 4MM

## (undated) DEVICE — SUTURE ETHILON 3-0 669H

## (undated) DEVICE — ENCORE® LATEX ACCLAIM SIZE 8, STERILE LATEX POWDER-FREE SURGICAL GLOVE: Brand: ENCORE

## (undated) DEVICE — ZIMMER® STERILE DISPOSABLE TOURNIQUET CUFF WITH PLC, DUAL PORT, SINGLE BLADDER, 30 IN. (76 CM)

## (undated) DEVICE — SUCTION CANISTER, 3000CC,SAFELINER: Brand: DEROYAL

## (undated) DEVICE — SOL  .9 3000ML

## (undated) DEVICE — ARTHROSCOPY: Brand: MEDLINE INDUSTRIES, INC.

## (undated) NOTE — LETTER
Date: 6/6/2018     Dx: Rotator cuff syndrome of right shoulder (M75.101)        Authorized # of Visits:  8       Referring MD: Benjie Hancock MD visit: none scheduled    Medication Changes since last visit?: No    Subjective: Patient reports that the (R)

## (undated) NOTE — LETTER
AUTHORIZATION FOR SURGICAL OPERATION OR OTHER PROCEDURE    1.  I hereby authorize Dr. Dante Love, and [facility] staff assigned to my case to perform the following operation and/or procedure at the [facility]:    ______IUD REMOVAL_____________ Patient signature:  ___________________________________________________             Relationship to Patient:             Parent    Responsible person                            Spouse  In case of minor or                     Other  _____________   Incompet